# Patient Record
Sex: MALE | Race: BLACK OR AFRICAN AMERICAN | NOT HISPANIC OR LATINO
[De-identification: names, ages, dates, MRNs, and addresses within clinical notes are randomized per-mention and may not be internally consistent; named-entity substitution may affect disease eponyms.]

---

## 2018-07-20 ENCOUNTER — APPOINTMENT (OUTPATIENT)
Age: 68
End: 2018-07-20
Payer: MEDICARE

## 2018-07-20 VITALS — HEIGHT: 69 IN | WEIGHT: 160 LBS | BODY MASS INDEX: 23.7 KG/M2

## 2018-07-20 PROCEDURE — 99204 OFFICE O/P NEW MOD 45 MIN: CPT

## 2018-10-30 ENCOUNTER — OUTPATIENT (OUTPATIENT)
Dept: OUTPATIENT SERVICES | Facility: HOSPITAL | Age: 68
LOS: 1 days | End: 2018-10-30
Payer: MEDICARE

## 2018-10-30 ENCOUNTER — APPOINTMENT (OUTPATIENT)
Dept: MRI IMAGING | Facility: HOSPITAL | Age: 68
End: 2018-10-30

## 2018-10-30 PROCEDURE — 70545 MR ANGIOGRAPHY HEAD W/DYE: CPT

## 2018-10-30 PROCEDURE — A9585: CPT

## 2018-10-30 PROCEDURE — 70545 MR ANGIOGRAPHY HEAD W/DYE: CPT | Mod: 26

## 2019-01-17 ENCOUNTER — APPOINTMENT (OUTPATIENT)
Dept: NEUROSURGERY | Facility: CLINIC | Age: 69
End: 2019-01-17
Payer: MEDICARE

## 2019-01-17 VITALS
DIASTOLIC BLOOD PRESSURE: 77 MMHG | HEART RATE: 64 BPM | WEIGHT: 160 LBS | OXYGEN SATURATION: 95 % | HEIGHT: 68 IN | RESPIRATION RATE: 16 BRPM | TEMPERATURE: 97.4 F | SYSTOLIC BLOOD PRESSURE: 146 MMHG | BODY MASS INDEX: 24.25 KG/M2

## 2019-01-17 PROCEDURE — 99215 OFFICE O/P EST HI 40 MIN: CPT

## 2019-01-22 NOTE — REASON FOR VISIT
[Follow-Up: _____] : a [unfilled] follow-up visit [FreeTextEntry1] : Here for comprehensive evaluation of his Lumbar Spine.\par He reports ongoing LEg pain and difficulty walking.\par He currently ambulates with cane. He reports that he has no interest in having back surgery. He further explained that Dr. Glasgow sent him for Evaluation of MRI findings.

## 2019-01-22 NOTE — HISTORY OF PRESENT ILLNESS
[FreeTextEntry1] : Here for follow up with New MRI  Cervical Spine\par Was seen in 2015- No surgery recommended at that time\par

## 2019-01-22 NOTE — REVIEW OF SYSTEMS
[Feeling Poorly] : feeling poorly [Abnormal Sensation] : an abnormal sensation [Difficulty Walking] : difficulty walking [Limping] : limping [Arthralgias] : arthralgias [Joint Stiffness] : joint stiffness [Limb Pain] : limb pain [Negative] : Endocrine [Leg Weakness] : no leg weakness [Joint Swelling] : no joint swelling [Limb Swelling] : no limb swelling

## 2019-01-22 NOTE — ASSESSMENT
[FreeTextEntry1] : 1. Lumbar Laminectomy recommended: Pt refused.\par 2. Continue Pain Management.\par 3. Education provided regarding plan of care

## 2019-01-22 NOTE — DATA REVIEWED
[de-identified] : Exam requested by:\par RICKEY SCOTT MD\par [address], [address]\[place] [address]\par SITE PERFORMED: [facility]\par Patient: KALEB ARVIZU\par YOB: 1950\par Phone: [phone]\par MRN: [id number] Acc: [id number]\par Date of Exam: 07-\par EXAM:  MRI LUMBAR SPINE WITHOUT CONTRAST\par \par HISTORY:  68-year-old male. Low back pain. Assess for spinal stenosis\par \par TECHNIQUE:  MRI of the lumbar spine was performed utilizing axial T1 and T2, as well as sagittal T1, T2, and proton density weighted pulse sequences, without intravenous contrast material.\par \par COMPARISON:  1/18/2008 lumbar spine radiographs\par \par FINDINGS:\par \par The conus terminates at the L1-2 disc space level.\par \par There is straightening of the normal lumbar lordosis. This may be based on muscle spasm and/or degenerative changes.\par \par There is a grade 1, 2 mm retrolisthesis of T11 on T12. There is a grade 1, 3 mm retrolisthesis of L1 on L2 and L2 on L3\par \par There are moderate to severe degenerative endplate marrow changes affecting the endplates of T11-S1 is a most advanced at L3-L5. There are endplates at every level, largest at L1-L5.. There is no abnormal diffuse marrow infiltration or focal suspicious bone lesion. There is no acute edema or compression fracture\par \par There is desiccation of the T10-11 through L5-S1 discs. There is mild narrowing of the T10-11 through L2-3 and L5-S1 discs. There is moderate to severe narrowing of the L3-4 and L4-5 disc space     \par \par There are multiple partially imaged renal cystic lesions. Largest left renal cystic lesion measures at least 4.6 cm in diameter. The largest visualized right renal cystic lesion measures 2.7 cm in diameter. There is no hydronephrosis     .\par \par There is an infrarenal abdominal aortic aneurysm measuring at least 3.1 cm in AP diameter.\par \par There is mild deconditioning/atrophy of the paraspinous muscles and the psoas muscles bilaterally.\par \par L1-L2: Grade 1, 3 mm retrolisthesis. Bulging disc diffusely resulting in mild left foraminal stenosis. Mild facet arthrosis. \par \par L2-L3: Grade 1, 3 mm retrolisthesis. Central/right foraminal protrusion resulting in moderate central and right lateral recess/foraminal stenosis. . Bulging disc component results in mild-to-moderate left foraminal stenosis with mild to moderate facet arthrosis\par \par L3-L4: Bulging disc/osteophyte complex diffusely resulting in severe central and bilateral lateral recess/foraminal stenosis with moderate facet arthrosis. Compression of the thecal sac and the exiting nerve roots.  \par \par L4-L5: Bulging disc/osteophyte complex resulting in severe central and bilateral lateral recess/foraminal stenosis with moderate to severe facet arthrosis. Compression the thecal sac and the exiting nerve roots\par \par L5-S1: Bulging disc/osteophyte complex resulting in severe left foraminal stenosis. Moderate to severe central and right retinal stenosis with severe facet arthrosis.\par \par IMPRESSION: \par \par \par 1. Grade 1, 2 mm retrolisthesis of T11 on T12. Grade 1, 3 m retrolisthesis of L1 on L2 and L2 on L3\par \par Recommend lumbar spine radiographs series including bilateral oblique and lateral, flexion, and extension views.\par \par 2. Advanced lower thoracic and lumbar spondylosis resulting in multilevel stenosis from L1-2 through L5-S1, as detailed above.\par \par 3. Central/right foraminal L2-3 disc herniation resulting in moderate central and right lateral recess/foraminal stenosis.\par \par 4. Bulging L3-4 L4-5 disc/osteophyte complex resulting in severe/critical central and bilateral lateral recess/foraminal stenosis in conjunction with facet arthrosis.. Compression of the thecal sac and the exiting nerve roots.\par \par 5. Infrarenal abdominal aortic aneurysm\par \par Recommend abdominal aortic Doppler ultrasound to assess aneurysm.\par \par 6. Multiple bilateral renal cystic lesions\par \par Recommend bilateral renal ultrasound\par Thank you for the opportunity to participate in the care of this patient.  \par  \par Daniel Davis MD [phone]\par Electronically Signed: 07- 11:17 AM\par Exam requested by:RICKEY SCOTT MD\par Confidential\par [facility]:\par [address] * [place] [address]\par  \par  \par  \par [facility]:\par [address]par Lower Level & 9th Floor * [place] [address]\par  \par [facility]:\par [address] * Ground Floor & Lower Level\[place] [address]\par  \par [facility]:\par [address] * 3rd & 5th Floor\[place] [address]\par  \par [facility]:\par [address] * [address] * [place] [address]\par  \par [address]par [address] * 3rd Floor\[place] [address]\par  \par  \par Printed: 01- 6:58 PM\par KALEB ARVIZU (Exam: 07- 9:00 AM)\par Page 1 of 1\par

## 2019-07-19 ENCOUNTER — APPOINTMENT (OUTPATIENT)
Dept: VASCULAR SURGERY | Facility: CLINIC | Age: 69
End: 2019-07-19

## 2019-09-04 DIAGNOSIS — I71.4 ABDOMINAL AORTIC ANEURYSM, W/OUT RUPTURE: ICD-10-CM

## 2019-09-06 ENCOUNTER — APPOINTMENT (OUTPATIENT)
Dept: VASCULAR SURGERY | Facility: CLINIC | Age: 69
End: 2019-09-06

## 2019-09-09 ENCOUNTER — FORM ENCOUNTER (OUTPATIENT)
Age: 69
End: 2019-09-09

## 2019-09-10 ENCOUNTER — APPOINTMENT (OUTPATIENT)
Dept: CT IMAGING | Facility: HOSPITAL | Age: 69
End: 2019-09-10
Payer: MEDICARE

## 2019-09-10 ENCOUNTER — OUTPATIENT (OUTPATIENT)
Dept: OUTPATIENT SERVICES | Facility: HOSPITAL | Age: 69
LOS: 1 days | End: 2019-09-10
Payer: MEDICARE

## 2019-09-10 PROCEDURE — 74174 CTA ABD&PLVS W/CONTRAST: CPT | Mod: 26

## 2019-09-10 PROCEDURE — 74174 CTA ABD&PLVS W/CONTRAST: CPT

## 2020-06-22 ENCOUNTER — APPOINTMENT (OUTPATIENT)
Dept: VASCULAR SURGERY | Facility: CLINIC | Age: 70
End: 2020-06-22
Payer: MEDICARE

## 2020-06-22 PROCEDURE — 99214 OFFICE O/P EST MOD 30 MIN: CPT

## 2020-06-22 PROCEDURE — 93923 UPR/LXTR ART STDY 3+ LVLS: CPT

## 2020-07-01 NOTE — PHYSICAL EXAM
[Normal Breath Sounds] : Normal breath sounds [2+] : left 2+ [Oriented to Person] : oriented to person [Alert] : alert [Oriented to Place] : oriented to place [Oriented to Time] : oriented to time [Calm] : calm [Ankle Swelling (On Exam)] : present [1+] : left 1+ [Ankle Swelling On The Right] : mild [Ankle Swelling Bilaterally] : bilaterally  [JVD] : no jugular venous distention  [Abdomen Tenderness] : ~T ~M No abdominal tenderness [de-identified] : well appearing, NAD [de-identified] : NC/AT

## 2020-07-01 NOTE — HISTORY OF PRESENT ILLNESS
[FreeTextEntry1] : 69 yo m with pmh of cervical spine fx, type 2 DM, HTN, and degenerative cervical disc, AAA that was measuring 3.7 cm in 9/2019 returns for a f/u.  Pt c/o numbness of his both legs that precludes him from walking more than 1-2 blocks. CTA from 9/2019 demonstrated occlusion of b/l internal iliac arteries w/lack of opacification of most internal iliac artery branches. He denies rest pain, skin breakdown, abdominal pain. He ambulates with a cane.

## 2020-07-01 NOTE — ASSESSMENT
[Arterial/Venous Disease] : arterial/venous disease [Aneurysm Surgery] : aneurysm surgery [FreeTextEntry1] : 69 yo m with pmh of cervical spine fx, type 2 DM, HTN, and degenerative cervical disc, AAA that was measuring 3.7 cm in 9/2019 returns for a f/u.\par Patient with claudication and hx of AAA.\par CHEL/PVR was done in the office today demonstrating moderate disease.\par We will obtain CTA of abdomen/pelvis to f/u on AAA and will decide on how to proceed.\par Pt will f/u in 2 weeks after he completes CTA.\par

## 2020-07-01 NOTE — REVIEW OF SYSTEMS
[Limb Pain] : limb pain [Negative] : Heme/Lymph [Fever] : no fever [Chills] : no chills [Dizziness] : no dizziness

## 2020-07-10 ENCOUNTER — APPOINTMENT (OUTPATIENT)
Dept: VASCULAR SURGERY | Facility: CLINIC | Age: 70
End: 2020-07-10
Payer: MEDICARE

## 2020-07-10 PROCEDURE — 99214 OFFICE O/P EST MOD 30 MIN: CPT

## 2020-07-10 NOTE — HISTORY OF PRESENT ILLNESS
[FreeTextEntry1] : 69 yo m with pmh of cervical spine fx, type 2 DM, HTN, and degenerative cervical disc, AAA that was measuring 3.7 cm in 9/2019 returns for a f/u.  Pt c/o numbness of his both legs that precludes him from walking more than 1-2 blocks. Patient was seen on 6/22/2020 and had CHEL/PVR done and we suggested for him to have CTA of aorta to be done to f/u on AAA and to look at his arterial disease of his LEs. Today he presents to discuss the results.

## 2020-07-10 NOTE — REVIEW OF SYSTEMS
[Limb Pain] : limb pain [Negative] : Heme/Lymph [Chills] : no chills [Fever] : no fever [Dizziness] : no dizziness

## 2020-07-10 NOTE — PHYSICAL EXAM
[Normal Breath Sounds] : Normal breath sounds [2+] : left 2+ [1+] : left 1+ [Ankle Swelling Bilaterally] : bilaterally  [Ankle Swelling (On Exam)] : present [Ankle Swelling On The Right] : mild [Alert] : alert [Oriented to Place] : oriented to place [Oriented to Person] : oriented to person [Calm] : calm [Oriented to Time] : oriented to time [de-identified] : well appearing, NAD [Abdomen Tenderness] : ~T ~M No abdominal tenderness [JVD] : no jugular venous distention  [de-identified] : NC/AT

## 2020-07-15 ENCOUNTER — OUTPATIENT (OUTPATIENT)
Dept: OUTPATIENT SERVICES | Facility: HOSPITAL | Age: 70
LOS: 1 days | End: 2020-07-15
Payer: MEDICARE

## 2020-07-15 ENCOUNTER — RESULT REVIEW (OUTPATIENT)
Age: 70
End: 2020-07-15

## 2020-07-15 ENCOUNTER — APPOINTMENT (OUTPATIENT)
Dept: ORTHOPEDIC SURGERY | Facility: CLINIC | Age: 70
End: 2020-07-15
Payer: MEDICARE

## 2020-07-15 VITALS
HEIGHT: 69 IN | OXYGEN SATURATION: 96 % | SYSTOLIC BLOOD PRESSURE: 135 MMHG | DIASTOLIC BLOOD PRESSURE: 70 MMHG | BODY MASS INDEX: 23.99 KG/M2 | TEMPERATURE: 97.6 F | WEIGHT: 162 LBS | HEART RATE: 54 BPM

## 2020-07-15 PROCEDURE — 72084 X-RAY EXAM ENTIRE SPI 6/> VW: CPT

## 2020-07-15 PROCEDURE — 72082 X-RAY EXAM ENTIRE SPI 2/3 VW: CPT

## 2020-07-15 PROCEDURE — 72084 X-RAY EXAM ENTIRE SPI 6/> VW: CPT | Mod: 26

## 2020-07-15 PROCEDURE — 99204 OFFICE O/P NEW MOD 45 MIN: CPT

## 2020-07-15 PROCEDURE — 72100 X-RAY EXAM L-S SPINE 2/3 VWS: CPT

## 2020-07-21 NOTE — PHYSICAL EXAM
[de-identified] : Physical Exam:\par \par General: patient is well developed, well nourished, in no acute \par distress, alert and oriented x 3. \par \par Mood and affect: normal\par \par Respiratory: no respiratory distress noted\par \par Skin: no scars over spine, skin intact, no erythema, increased warmth\par \par Alignment:The spine is well compensated in the coronal and sagittal plane.  There is no asymmetry on the patel forward bend test\par \par Gait: The patient is able to toe walk and heel walk without difficulty. The patient is able to tandem gait without difficutly.\par \par Palpation: no tenderness to palpation spine or paraspinal region\par \par Range of motion: Lumbar spine ROM is restricted\par \par Neurologic Exam:\par Motor: Manual Muscle testing in the lower extremities is 5 out of 5 in all muscle groups. There is no evidence of muscular atrophy in the lower extremities. Sensory: Sensation to light touch is grossly intact in the lower extremities\par \par Reflexes: DTR are present and symmetric throughout, negative sena bilaterally, negative inverted radial reflex bilaterally, no clonus, plantar responses are flexor\par \par Hip Exam: No pain with internal or external rotation of hips bilaterally\par \par Special tests: Straight leg raise test negative.  Cross straight leg test negative.  DILLON test negative\par \par Vascular: Examination of the peripheral vascular system demonstrates no evidence of congestion or edema. no evidence of lymphadema bilateral lower extremities, pulses are present and symmetric in both lower extremities. [de-identified] : MRI 2018 lumbar severe L2-5 stenosis\par \par XR 7/20: Prior OC fusion, rods are broken.  chronic T12 wedge deformity.  questonable L4 pars defect on xray.  moderate L4/5 disc loss, mild at other levels.  no instaiblity seen..

## 2020-07-21 NOTE — HISTORY OF PRESENT ILLNESS
[de-identified] : Mr. ARVIZU is a very pleasant 70 year old male who complains of  severe right sided back pain radiating down right leg to the foot.  this is chronic.  also has severe bilateral lower extremity claudication that comes on after 1-2 blocks, causing him to need to stop, resolved with rest.  seen by dr. saxena of vascular surgery and no evidence of arterial occlusive disease.  . On initial presentation symptoms were as follows: Patient described the pain as constant.  Patient rated the pain as severe.  The pain localized to lower back.  There is radiation of pain.  Patient  reports lower extremity numbness and paresthesias.\par \par The patient reports no loss of hand dexterity.\par The patient states there no loss of balance when walking.\par There no sensory loss in the arms or legs\par The patent no difficulty with urination.\par \par The patient no history of previous spine surgery.\par \par The patient has no history of unexpected weight loss, no history of active cancer, no history bladder or bowel dysfunction, no night pain, no fevers or chills.\par \par The past medical history, surgical history, family history, allergies, medications, review of systems, family history and social history were reviewed and non contributory.

## 2020-07-21 NOTE — DISCUSSION/SUMMARY
[de-identified] : Has evidence of severe lumbar stenosis from MRI 2018.  Symptoms consistent with severe neurogenic claudication consistent with prior imaging.  recommend new MRI lumbar spine.  follow up after imaging done.

## 2020-07-29 ENCOUNTER — APPOINTMENT (OUTPATIENT)
Dept: ORTHOPEDIC SURGERY | Facility: CLINIC | Age: 70
End: 2020-07-29

## 2020-07-31 ENCOUNTER — APPOINTMENT (OUTPATIENT)
Dept: CT IMAGING | Facility: HOSPITAL | Age: 70
End: 2020-07-31
Payer: MEDICARE

## 2020-07-31 ENCOUNTER — OUTPATIENT (OUTPATIENT)
Dept: OUTPATIENT SERVICES | Facility: HOSPITAL | Age: 70
LOS: 1 days | End: 2020-07-31
Payer: MEDICARE

## 2020-07-31 PROCEDURE — 74174 CTA ABD&PLVS W/CONTRAST: CPT | Mod: 26

## 2020-07-31 PROCEDURE — 74174 CTA ABD&PLVS W/CONTRAST: CPT

## 2020-08-04 ENCOUNTER — APPOINTMENT (OUTPATIENT)
Dept: ORTHOPEDIC SURGERY | Facility: CLINIC | Age: 70
End: 2020-08-04
Payer: MEDICARE

## 2020-08-04 VITALS — TEMPERATURE: 96.7 F

## 2020-08-04 PROCEDURE — 99215 OFFICE O/P EST HI 40 MIN: CPT | Mod: 57

## 2020-08-04 NOTE — DISCUSSION/SUMMARY
[de-identified] : I have discussed my findings with the patient.  Mr. Hopson is suffering from progressive severe lumbar radiculopathy and neurogenic claudication.  His new MRI shows L2-S1 stenosis, severe.  We discussed options for treatment.  The patient is a candidate for L2-S1 laminectomy, partial facetectomy, foraminotomies.  Further non operative treatment would include pain management.  Risks, benefits, alternatives were discussed with the patient at great length, including but not limited to, bleeding, infection, persistent neurologic deficit, worsening pain, worsening neurologic status, CSF leakage, medical complications (including but not limited to cardiac, pulmonary, renal), and the need for further surgery.  The patient asked a number of cogent questions.  All questions were answered.  The patient demonstrated understanding of the risks, benefits, and alternatives.  The patient will discuss it with his family and PMD.  Materials given to read.  Will contact us if he wishes to proceed.

## 2020-08-04 NOTE — HISTORY OF PRESENT ILLNESS
[de-identified] : Follow up: 8/4/20: continues to have severe back pain radiating down both lower extremities, worse on the right.  has bilateral lower extremity claudication that starts after 1-2 blocks, causing him to stop and resolved with rest.  had MRI lumbar spine.\par \par Initial: Mr. ARVIZU is a very pleasant 70 year old male who complains of  severe right sided back pain radiating down right leg to the foot.  this is chronic.  also has severe bilateral lower extremity claudication that comes on after 1-2 blocks, causing him to need to stop, resolved with rest.  seen by dr. saxena of vascular surgery and no evidence of arterial occlusive disease.  . On initial presentation symptoms were as follows: Patient described the pain as constant.  Patient rated the pain as severe.  The pain localized to lower back.  There is radiation of pain.  Patient  reports lower extremity numbness and paresthesias.\par \par The patient reports no loss of hand dexterity.\par The patient states there no loss of balance when walking.\par There no sensory loss in the arms or legs\par The patent no difficulty with urination.\par \par The patient no history of previous spine surgery.\par \par The patient has no history of unexpected weight loss, no history of active cancer, no history bladder or bowel dysfunction, no night pain, no fevers or chills.\par \par The past medical history, surgical history, family history, allergies, medications, review of systems, family history and social history were reviewed and non contributory.

## 2020-08-04 NOTE — PHYSICAL EXAM
[de-identified] : MRI 2020: L2/3 moderate canal stenosis, L3/4: severe canal stenosis, foraminal stenosis, L4/5 very severe canal stenosis, foraminal stenosis L5/S1 foraminal stenosis\par \par MRI 2018 lumbar severe L2-5 stenosis\par \par XR 7/20: Prior OC fusion, rods are broken.  chronic T12 wedge deformity.  questonable L4 pars defect on xray.  moderate L4/5 disc loss, mild at other levels.  no instaiblity seen.. [de-identified] : Physical Exam:\par \par General: patient is well developed, well nourished, in no acute \par distress, alert and oriented x 3. \par \par Mood and affect: normal\par \par Respiratory: no respiratory distress noted\par \par Skin: no scars over spine, skin intact, no erythema, increased warmth\par \par Alignment:The spine is well compensated in the coronal and sagittal plane.  There is no asymmetry on the patel forward bend test\par \par Gait: The patient is able to toe walk and heel walk without difficulty. The patient is able to tandem gait without difficutly.\par \par Palpation: no tenderness to palpation spine or paraspinal region\par \par Range of motion: Lumbar spine ROM is restricted\par \par Neurologic Exam:\par Motor: Manual Muscle testing in the lower extremities is 5 out of 5 in all muscle groups. There is no evidence of muscular atrophy in the lower extremities. Sensory: Sensation to light touch is grossly intact in the lower extremities\par \par Reflexes: DTR are present and symmetric throughout, negative sena bilaterally, negative inverted radial reflex bilaterally, no clonus, plantar responses are flexor\par \par Hip Exam: No pain with internal or external rotation of hips bilaterally\par \par Special tests: Straight leg raise test negative.  Cross straight leg test negative.  DILLON test negative\par \par Vascular: Examination of the peripheral vascular system demonstrates no evidence of congestion or edema. no evidence of lymphadema bilateral lower extremities, pulses are present and symmetric in both lower extremities.

## 2020-11-10 ENCOUNTER — APPOINTMENT (OUTPATIENT)
Dept: ORTHOPEDIC SURGERY | Facility: CLINIC | Age: 70
End: 2020-11-10
Payer: MEDICARE

## 2020-11-10 VITALS — TEMPERATURE: 97.1 F

## 2020-11-10 PROCEDURE — 99214 OFFICE O/P EST MOD 30 MIN: CPT

## 2020-11-10 PROCEDURE — 99072 ADDL SUPL MATRL&STAF TM PHE: CPT

## 2021-01-29 ENCOUNTER — APPOINTMENT (OUTPATIENT)
Dept: VASCULAR SURGERY | Facility: CLINIC | Age: 71
End: 2021-01-29
Payer: MEDICARE

## 2021-01-29 PROCEDURE — 99213 OFFICE O/P EST LOW 20 MIN: CPT

## 2021-01-29 PROCEDURE — 93978 VASCULAR STUDY: CPT

## 2021-01-29 PROCEDURE — 99072 ADDL SUPL MATRL&STAF TM PHE: CPT

## 2021-01-29 NOTE — ASSESSMENT
[FreeTextEntry1] : 71 yo m with pmh of cervical spine fx, type 2 DM, HTN, and degenerative cervical disc, AAA that was measuring 4.1 cm from CTA on 6/22/2020. On exam, general abdominal exam benign, no pulsatile abdominal masses, soft, no guarding and no rebound. Discussed with pt lower back and LE pain likely secondary to arthritis.No vascular surgery interventions indicated at this time. Will continue to monitor for now, pt will follow up with us here with repeat AAA US in 6 months.

## 2021-01-29 NOTE — PHYSICAL EXAM
[Normal Breath Sounds] : Normal breath sounds [2+] : left 2+ [1+] : left 1+ [Ankle Swelling Bilaterally] : bilaterally  [Ankle Swelling (On Exam)] : present [Ankle Swelling On The Right] : mild [No Rash or Lesion] : No rash or lesion [Alert] : alert [Oriented to Person] : oriented to person [Oriented to Place] : oriented to place [Oriented to Time] : oriented to time [Calm] : calm [JVD] : no jugular venous distention  [Abdomen Tenderness] : ~T ~M No abdominal tenderness [de-identified] : well appearing, NAD [de-identified] : NC/AT [de-identified] : FROM 5/5 x 4. ambulates with a cane.

## 2021-01-29 NOTE — ADDENDUM
[FreeTextEntry1] : This note was written by Mimi Wallace on 01/29/2021 acting as scribe for Stephanie Rankin M.D.\par \par I, Stephanie Lauren have read and attest that all the information, medical decision making and discharge instructions within are true and accurate.

## 2021-01-29 NOTE — HISTORY OF PRESENT ILLNESS
[FreeTextEntry1] : 69 y/o M with PMH of cervical spine fx, T2DM, HTN, and degenerative cervical disc, AAA measuring 4.1 cm, occlusion of b/l Internal iliac arteries with lack of opacification of most internal iliac branches from CTA of aorta completed on 6/22/2020 presents for a follow up evaluation.  Pt reports he continues to experience some discomfort to his lower back and LEs but has been active around the house. He denies any unusual chest pain, abdominal pain, bladder or bowel dysfunction, falls, fever or chills.

## 2021-01-29 NOTE — PROCEDURE
[FreeTextEntry1] : Abdominal US: + AAA in the infrarenal segment of the aorta, measuring 3.5 x 3.7 cm. Thrombus noted in the aneurysmal cavity.

## 2021-02-09 NOTE — DISCUSSION/SUMMARY
[de-identified] : I have discussed my findings with the patient. Mr. Hopson is suffering from progressive severe lumbar radiculopathy and neurogenic claudication. His new MRI shows L2-S1 stenosis, severe. We discussed options for treatment. The patient is a candidate for L2-S1 laminectomy, partial facetectomy, foraminotomies. Further non operative treatment would include pain management. Risks, benefits, alternatives were discussed with the patient at great length, including but not limited to, bleeding, infection, persistent neurologic deficit, worsening pain, worsening neurologic status, CSF leakage, medical complications (including but not limited to cardiac, pulmonary, renal), and the need for further surgery. The patient asked a number of cogent questions. Although strict hospital protocols are in place, also discussed the perioperative risk of justin COVID-19 during hospital stay. Patient understands. All questions were answered. The patient demonstrated understanding of the risks, benefits, and alternatives. The patient will consider surgery. Materials given to read. Will contact us if he wishes to proceed. Would need CT lumbar without contrast for preoperative planning.\par  \par

## 2021-02-09 NOTE — PHYSICAL EXAM
[de-identified] : Physical Exam:\par \par General: patient is well developed, well nourished, in no acute \par distress, alert and oriented x 3. \par \par Mood and affect: normal\par \par Respiratory: no respiratory distress noted\par \par Skin: no scars over spine, skin intact, no erythema, increased warmth\par \par Alignment:The spine is well compensated in the coronal and sagittal plane. There is no asymmetry on the patel forward bend test\par \par Gait: The patient is able to toe walk and heel walk without difficulty. The patient is able to tandem gait without difficutly.\par \par Palpation: no tenderness to palpation spine or paraspinal region\par \par Range of motion: Lumbar spine ROM is restricted\par \par Neurologic Exam:\par Motor: Manual Muscle testing in the lower extremities is 5 out of 5 in all muscle groups. There is no evidence of muscular atrophy in the lower extremities. Sensory: Sensation to light touch is grossly intact in the lower extremities\par \par Reflexes: DTR are present and symmetric throughout, negative sena bilaterally, negative inverted radial reflex bilaterally, no clonus, plantar responses are flexor\par \par Hip Exam: No pain with internal or external rotation of hips bilaterally\par \par Special tests: Straight leg raise test negative. Cross straight leg test negative. DILLON test negative\par \par Vascular: Examination of the peripheral vascular system demonstrates no evidence of congestion or edema. no evidence of lymphadema bilateral lower extremities, pulses are present and symmetric in both lower extremities.  [de-identified] : MRI 7/2020: L2/3 moderate canal stenosis, L3/4: severe canal stenosis, foraminal stenosis, L4/5 very severe canal stenosis, foraminal stenosis L5/S1 foraminal stenosis\par \par MRI 2018 lumbar severe L2-5 stenosis\par \par XR 7/20: Prior OC fusion, rods are broken. chronic T12 wedge deformity. questonable L4 pars defect on xray. moderate L4/5 disc loss, mild at other levels. no instaiblity seen..

## 2021-02-09 NOTE — HISTORY OF PRESENT ILLNESS
[de-identified] : Follow up 11/10/20: Patient presents for follow up. Continues to have severe right sided low back pain that radiates down both lower extremities. Symptoms exacerbated with standing/walking less than 1 block, resolve with rest. Denies bowel/bladder symptoms. Had exacerbation of symptoms 2-3 weeks ago, was walking down the street, had to sit down due to pain. EMS called and taken to [facility]. Discharged same day once pain controlled. Ambulating with a cane for stability. \par \par Follow up: 8/4/20: continues to have severe back pain radiating down both lower extremities, worse on the right. has bilateral lower extremity claudication that starts after 1-2 blocks, causing him to stop and resolved with rest. had MRI lumbar spine.\par \par Initial: Mr. ARVIZU is a very pleasant 70 year old male who complains of severe right sided back pain radiating down right leg to the foot. this is chronic. also has severe bilateral lower extremity claudication that comes on after 1-2 blocks, causing him to need to stop, resolved with rest. seen by dr. saxena of vascular surgery and no evidence of arterial occlusive disease.. On initial presentation symptoms were as follows: Patient described the pain as constant. Patient rated the pain as severe. The pain localized to lower back. There is radiation of pain. Patient reports lower extremity numbness and paresthesias.\par \par The patient reports no loss of hand dexterity.\par The patient states there no loss of balance when walking.\par There no sensory loss in the arms or legs\par The patent no difficulty with urination.\par \par The patient no history of previous spine surgery.\par \par The patient has no history of unexpected weight loss, no history of active cancer, no history bladder or bowel dysfunction, no night pain, no fevers or chills.\par \par The past medical history, surgical history, family history, allergies, medications, review of systems, family history and social history were reviewed and non contributory. \par  \par

## 2021-07-30 ENCOUNTER — APPOINTMENT (OUTPATIENT)
Dept: VASCULAR SURGERY | Facility: CLINIC | Age: 71
End: 2021-07-30
Payer: MEDICARE

## 2021-07-30 VITALS
DIASTOLIC BLOOD PRESSURE: 94 MMHG | SYSTOLIC BLOOD PRESSURE: 150 MMHG | WEIGHT: 168 LBS | HEART RATE: 73 BPM | HEIGHT: 69 IN | BODY MASS INDEX: 24.88 KG/M2

## 2021-07-30 PROCEDURE — 93978 VASCULAR STUDY: CPT

## 2021-07-30 PROCEDURE — 99213 OFFICE O/P EST LOW 20 MIN: CPT | Mod: 25

## 2021-08-03 NOTE — ASSESSMENT
[Arterial/Venous Disease] : arterial/venous disease [Medication Management] : medication management [Aneurysm Surgery] : aneurysm surgery [FreeTextEntry1] : 70 y/o M w/h/o cervical spine fx, type 2 DM, HTN, and degenerative cervical disc, AAA initially measured  3.7 cm in 9/2019 currently at 4.2 cm 7/2020 returns for a f/u on AAA. On exam, general exam benign. Abdominal duplex completed in the office today reveals AAA measuring 3.6 x 3.8 cm w/thrombus in the aneurysmal cavity. L ALEX measures 1.3 x 1.7 cm, R ALEX measures: 1.3 x 1.4 cm. Patient was explained that no vascular intervention is necessary at this time. Patient explained lower back symptoms are not from his aneurysm and recommended he see Dr. Fletcher for back pain. He is to f/u in 1 year.

## 2021-08-03 NOTE — PROCEDURE
[FreeTextEntry1] : Abdominal US ordered today to assess aneurysmal progression reveals: AAA measuring 3.6 x 3.8 cm w/thrombus in the aneurysmal cavity. L ALEX measures 1.3 x 1.7 cm, R ALEX measures: 1.3 x 1.4 cm.

## 2021-08-03 NOTE — HISTORY OF PRESENT ILLNESS
[FreeTextEntry1] : 70 y/o M w/h/o cervical spine fx, type 2 DM, HTN, and degenerative cervical disc, AAA initially measured  3.7 cm in 9/2019 currently at 3.5x 3.7 cm 7/2020 returns for a f/u on AAA.  Pt reports during the past week he developed severe arm  pain which prompted a visit to the [facility]. He was provided w/steroids and analgesics with significant improvement of his symptoms. Also, patient explains his lower back pain persists and would like to be provided referral for spine specialist. Otherwise denies any unusual abdominal pain, bladder or bowel dysfunction, n/v/d, fever or chills. \par \par \par \par

## 2021-08-03 NOTE — DATA REVIEWED
[FreeTextEntry1] : Abdominal US completed 1/29/2021 reveals: AAA measuring 3.5 x 3.7 cm w/ thrombus noted in the aneurysmal cavity.

## 2021-08-03 NOTE — PHYSICAL EXAM
[Normal Breath Sounds] : Normal breath sounds [Normal Heart Sounds] : normal heart sounds [2+] : left 2+ [1+] : left 1+ [Ankle Swelling (On Exam)] : present [Ankle Swelling Bilaterally] : bilaterally  [Ankle Swelling On The Right] : mild [Alert] : alert [Oriented to Person] : oriented to person [Oriented to Place] : oriented to place [Oriented to Time] : oriented to time [Calm] : calm [JVD] : no jugular venous distention  [Abdomen Tenderness] : ~T ~M No abdominal tenderness [Abdominal Bruit] : no abdominal bruit  [de-identified] : Calm and cooperative NAD [de-identified] : NC/AT [de-identified] : Supple  [de-identified] : BS+, soft, NT/ND [de-identified] : FROM [de-identified] : Grossly intact.

## 2021-08-03 NOTE — ADDENDUM
[FreeTextEntry1] : This note was written by Mimi Wallace on 07/30/2021 acting as scribe for Stephanie Rankin M.D.\par \par I, Dr. Stephanie Patel, personally performed the evaluation and management (E/M) services for this established patient who presents today with (an) existing condition(s).  That E/M includes conducting the examination, assessing all conditions, and (re)establishing/reinforcing a plan of care.  Today, my ACP, Kelsey TADEO, was here to observe my evaluation and management services for this condition to be followed going forward.\par \par \par \par \par

## 2021-08-24 ENCOUNTER — APPOINTMENT (OUTPATIENT)
Dept: ORTHOPEDIC SURGERY | Facility: CLINIC | Age: 71
End: 2021-08-24
Payer: MEDICARE

## 2021-08-24 PROCEDURE — 99214 OFFICE O/P EST MOD 30 MIN: CPT

## 2021-08-24 RX ORDER — ALLOPURINOL 100 MG/1
100 TABLET ORAL
Qty: 90 | Refills: 0 | Status: ACTIVE | COMMUNITY
Start: 2021-03-13

## 2021-08-24 RX ORDER — BRINZOLAMIDE 10 MG/ML
1 SUSPENSION/ DROPS OPHTHALMIC
Qty: 10 | Refills: 0 | Status: ACTIVE | COMMUNITY
Start: 2021-06-21

## 2021-08-24 RX ORDER — BRIMONIDINE TARTRATE, TIMOLOL MALEATE 2; 5 MG/ML; MG/ML
0.2-0.5 SOLUTION/ DROPS OPHTHALMIC
Qty: 5 | Refills: 0 | Status: ACTIVE | COMMUNITY
Start: 2021-03-27

## 2021-08-24 RX ORDER — SIMVASTATIN 20 MG/1
20 TABLET, FILM COATED ORAL
Qty: 90 | Refills: 0 | Status: ACTIVE | COMMUNITY
Start: 2021-08-13

## 2021-08-24 RX ORDER — SILDENAFIL 50 MG/1
50 TABLET ORAL
Qty: 10 | Refills: 0 | Status: ACTIVE | COMMUNITY
Start: 2021-05-17

## 2021-08-24 RX ORDER — LOSARTAN POTASSIUM 100 MG/1
100 TABLET, FILM COATED ORAL
Qty: 90 | Refills: 0 | Status: ACTIVE | COMMUNITY
Start: 2021-08-13

## 2021-08-24 RX ORDER — METOPROLOL SUCCINATE 25 MG/1
25 TABLET, EXTENDED RELEASE ORAL
Qty: 90 | Refills: 0 | Status: ACTIVE | COMMUNITY
Start: 2021-08-13

## 2021-08-24 RX ORDER — TIZANIDINE 4 MG/1
4 TABLET ORAL
Qty: 40 | Refills: 0 | Status: ACTIVE | COMMUNITY
Start: 2021-08-18

## 2021-08-24 RX ORDER — PREDNISONE 20 MG/1
20 TABLET ORAL
Qty: 21 | Refills: 0 | Status: ACTIVE | COMMUNITY
Start: 2021-07-28

## 2021-08-24 RX ORDER — NABUMETONE 750 MG/1
750 TABLET, FILM COATED ORAL
Qty: 30 | Refills: 0 | Status: ACTIVE | COMMUNITY
Start: 2021-04-10

## 2021-08-24 RX ORDER — BIMATOPROST 0.1 MG/ML
0.01 SOLUTION/ DROPS OPHTHALMIC
Qty: 2 | Refills: 0 | Status: ACTIVE | COMMUNITY
Start: 2021-03-30

## 2021-08-24 RX ORDER — NETARSUDIL 0.2 MG/ML
0.02 SOLUTION/ DROPS OPHTHALMIC; TOPICAL
Qty: 2 | Refills: 0 | Status: ACTIVE | COMMUNITY
Start: 2021-04-16

## 2021-08-24 RX ORDER — AMLODIPINE BESYLATE 5 MG/1
5 TABLET ORAL
Qty: 90 | Refills: 0 | Status: ACTIVE | COMMUNITY
Start: 2021-08-13

## 2021-08-24 NOTE — DISCUSSION/SUMMARY
[de-identified] : I have discussed my findings with the patient. Mr. Hopson is suffering from progressive severe lumbar radiculopathy and neurogenic claudication. His new MRI shows L2-S1 stenosis, severe. We discussed options for treatment. Patient not currently interested in surgical intervention. I am recommending an evaluation with Dr. Bose for consideration of lumbar ROBSON, referral given. Patient will follow up with me in 3-4 months, sooner if there is an issue. All questions answered.

## 2021-08-24 NOTE — HISTORY OF PRESENT ILLNESS
[de-identified] : Follow up 8/24/21: Patient presents for follow up. Continues to have severe right sided low back pain that radiates laterally down his right lower extremity to his foot. Symptoms largely unchanged from last visit. Denies new neurologic symptoms. Ambulating with a cane for support. Can walk 2-3 blocks before stopping. Has not had any treatment since last visit. \par \par Follow up 11/10/20: Patient presents for follow up. Continues to have severe right sided low back pain that radiates down both lower extremities. Symptoms exacerbated with standing/walking less than 1 block, resolve with rest. Denies bowel/bladder symptoms. Had exacerbation of symptoms 2-3 weeks ago, was walking down the street, had to sit down due to pain. EMS called and taken to [facility]. Discharged same day once pain controlled. Ambulating with a cane for stability. \par \par Follow up: 8/4/20: continues to have severe back pain radiating down both lower extremities, worse on the right. has bilateral lower extremity claudication that starts after 1-2 blocks, causing him to stop and resolved with rest. had MRI lumbar spine.\par \par Initial: Mr. ARVIZU is a very pleasant 70 year old male who complains of severe right sided back pain radiating down right leg to the foot. this is chronic. also has severe bilateral lower extremity claudication that comes on after 1-2 blocks, causing him to need to stop, resolved with rest. seen by dr. saxena of vascular surgery and no evidence of arterial occlusive disease.. On initial presentation symptoms were as follows: Patient described the pain as constant. Patient rated the pain as severe. The pain localized to lower back. There is radiation of pain. Patient reports lower extremity numbness and paresthesias.\par \par The patient reports no loss of hand dexterity.\par The patient states there no loss of balance when walking.\par There no sensory loss in the arms or legs\par The patent no difficulty with urination.\par \par The patient no history of previous spine surgery.\par \par The patient has no history of unexpected weight loss, no history of active cancer, no history bladder or bowel dysfunction, no night pain, no fevers or chills.\par \par The past medical history, surgical history, family history, allergies, medications, review of systems, family history and social history were reviewed and non contributory. \par  \par

## 2021-08-24 NOTE — PHYSICAL EXAM
[de-identified] : Physical Exam:\par \par General: patient is well developed, well nourished, in no acute \par distress, alert and oriented x 3. \par \par Mood and affect: normal\par \par Respiratory: no respiratory distress noted\par \par Skin: no scars over spine, skin intact, no erythema, increased warmth\par \par Alignment:The spine is well compensated in the coronal and sagittal plane. There is no asymmetry on the patel forward bend test\par \par Gait: The patient is able to toe walk and heel walk without difficulty. The patient is able to tandem gait without difficutly.\par \par Palpation: no tenderness to palpation spine or paraspinal region\par \par Range of motion: Lumbar spine ROM is restricted\par \par Neurologic Exam:\par Motor: Manual Muscle testing in the lower extremities is 5 out of 5 in all muscle groups. There is no evidence of muscular atrophy in the lower extremities. Sensory: Sensation to light touch is grossly intact in the lower extremities\par \par Reflexes: DTR are present and symmetric throughout, negative sena bilaterally, negative inverted radial reflex bilaterally, no clonus, plantar responses are flexor\par \par Hip Exam: No pain with internal or external rotation of hips bilaterally\par \par Special tests: Straight leg raise test negative. Cross straight leg test negative. DILLON test negative\par \par Vascular: Examination of the peripheral vascular system demonstrates no evidence of congestion or edema. no evidence of lymphadema bilateral lower extremities, pulses are present and symmetric in both lower extremities.  [de-identified] : MRI 7/2020: L2/3 moderate canal stenosis, L3/4: severe canal stenosis, foraminal stenosis, L4/5 very severe canal stenosis, foraminal stenosis L5/S1 foraminal stenosis\par \par MRI 2018 lumbar severe L2-5 stenosis\par \par XR 7/20: Prior OC fusion, rods are broken. chronic T12 wedge deformity. questonable L4 pars defect on xray. moderate L4/5 disc loss, mild at other levels. no instaiblity seen..

## 2021-09-15 ENCOUNTER — APPOINTMENT (OUTPATIENT)
Dept: PHYSICAL MEDICINE AND REHAB | Facility: CLINIC | Age: 71
End: 2021-09-15
Payer: MEDICARE

## 2021-09-15 VITALS
SYSTOLIC BLOOD PRESSURE: 140 MMHG | DIASTOLIC BLOOD PRESSURE: 80 MMHG | BODY MASS INDEX: 24.88 KG/M2 | TEMPERATURE: 98 F | HEART RATE: 77 BPM | HEIGHT: 69 IN | WEIGHT: 168 LBS | OXYGEN SATURATION: 98 %

## 2021-09-15 PROCEDURE — 99204 OFFICE O/P NEW MOD 45 MIN: CPT

## 2021-10-12 ENCOUNTER — APPOINTMENT (OUTPATIENT)
Dept: PHYSICAL MEDICINE AND REHAB | Facility: CLINIC | Age: 71
End: 2021-10-12

## 2022-05-02 ENCOUNTER — APPOINTMENT (OUTPATIENT)
Dept: VASCULAR SURGERY | Facility: CLINIC | Age: 72
End: 2022-05-02
Payer: MEDICARE

## 2022-05-02 VITALS
BODY MASS INDEX: 24.88 KG/M2 | SYSTOLIC BLOOD PRESSURE: 197 MMHG | DIASTOLIC BLOOD PRESSURE: 112 MMHG | HEART RATE: 76 BPM | WEIGHT: 168 LBS | HEIGHT: 69 IN

## 2022-05-02 PROCEDURE — 93978 VASCULAR STUDY: CPT

## 2022-05-02 PROCEDURE — 99212 OFFICE O/P EST SF 10 MIN: CPT

## 2022-05-03 RX ORDER — GABAPENTIN 300 MG/1
300 CAPSULE ORAL
Qty: 180 | Refills: 0 | Status: COMPLETED | COMMUNITY
Start: 2022-01-06

## 2022-05-03 RX ORDER — CIPROFLOXACIN HYDROCHLORIDE 500 MG/1
500 TABLET, FILM COATED ORAL
Qty: 14 | Refills: 0 | Status: COMPLETED | COMMUNITY
Start: 2022-03-17

## 2022-05-04 NOTE — PHYSICAL EXAM
[Normal Breath Sounds] : Normal breath sounds [Respiratory Effort] : normal respiratory effort [Normal Heart Sounds] : normal heart sounds [Normal Rate and Rhythm] : normal rate and rhythm [2+] : left 2+ [No Rash or Lesion] : No rash or lesion [Alert] : alert [Oriented to Person] : oriented to person [Oriented to Place] : oriented to place [Calm] : calm [Abdomen Tenderness] : ~T ~M No abdominal tenderness [de-identified] : NAD [FreeTextEntry1] : Pulsatile abdominal mass [de-identified] : +FROM

## 2022-05-04 NOTE — HISTORY OF PRESENT ILLNESS
[FreeTextEntry1] : 71 y/o M w/h/o cervical spine fx, type 2 DM, HTN, and degenerative cervical disc, AAA initially measured 3.7 cm in 9/2019 currently at 3.5x 3.7 cm 7/2020 returns for a f/u on AAA. Last U/S measurement in 7/2021 measured 3.6x 3.8cm. He denies any abdominal pain, N/V, leg pain, or claudication.\par

## 2022-05-04 NOTE — ADDENDUM
[FreeTextEntry1] : I, Dr. Santiago Childs, personally performed the evaluation and management (E/M) services for this established patient who presents today with (an) existing condition(s).  That E/M includes conducting the examination, assessing all conditions, and (re)establishing/reinforcing a plan of care.  Today, my ACP, Kelsey TADEO, was here to observe my evaluation and management services for this condition to be followed going forward.\par \par \par

## 2022-05-04 NOTE — ASSESSMENT
[Aneurysm Surgery] : aneurysm surgery [FreeTextEntry1] : 72 y/o M w/h/o cervical spine fx, type 2 DM, HTN, and degenerative cervical disc, AAA initially measured 3.7 cm in 9/2019 currently at 3.5x 3.7 cm 7/2020 returns for a f/u on AAA. Last U/S measurement in 7/2021 measured 3.6x 3.8cm. He denies any abdominal pain, N/V, leg pain, or claudication. It now measures 4cm\par Patient remains asymptomatic, no claudication\par - F/u in 1 year for repeat duplex

## 2023-05-01 ENCOUNTER — APPOINTMENT (OUTPATIENT)
Dept: VASCULAR SURGERY | Facility: CLINIC | Age: 73
End: 2023-05-01
Payer: MEDICARE

## 2023-05-01 VITALS
DIASTOLIC BLOOD PRESSURE: 105 MMHG | WEIGHT: 168 LBS | BODY MASS INDEX: 24.88 KG/M2 | HEART RATE: 97 BPM | HEIGHT: 69 IN | SYSTOLIC BLOOD PRESSURE: 197 MMHG

## 2023-05-01 PROCEDURE — 93978 VASCULAR STUDY: CPT

## 2023-05-01 PROCEDURE — 99214 OFFICE O/P EST MOD 30 MIN: CPT

## 2023-05-01 NOTE — HISTORY OF PRESENT ILLNESS
[FreeTextEntry1] : 73yoM w/h/o cervical spine fx, type 2 DM, HTN, and degenerative cervical disc, AAA initially measured 3.7 cm in 9/2019  returns for a f/u on AAA. Last U/S measurement in 5/2022 measured 4cm. He denies any abdominal pain, N/V, leg pain, or claudication. He is doing well overall.

## 2023-05-01 NOTE — PHYSICAL EXAM
[Normal Breath Sounds] : Normal breath sounds [Respiratory Effort] : normal respiratory effort [Normal Heart Sounds] : normal heart sounds [Normal Rate and Rhythm] : normal rate and rhythm [2+] : left 2+ [Abdomen Tenderness] : ~T ~M No abdominal tenderness [No Rash or Lesion] : No rash or lesion [Alert] : alert [Oriented to Person] : oriented to person [Oriented to Place] : oriented to place [Calm] : calm [de-identified] : NAD [FreeTextEntry1] : Pulsatile abdominal mass [de-identified] : +FROM

## 2023-05-01 NOTE — ASSESSMENT
[FreeTextEntry1] : 73yoM w/h/o cervical spine fx, type 2 DM, HTN, and degenerative cervical disc, AAA initially measured 3.7 cm in 9/2019  returns for a f/u on AAA. He denies any abdominal pain, N/V, leg pain, or claudication. It now measures 4.2cm.\par Patient remains asymptomatic, no claudication.\par We discussed the findings and recommend to f/u in 1 year for repeat duplex. [Aneurysm Surgery] : aneurysm surgery

## 2023-05-01 NOTE — ADDENDUM
[FreeTextEntry1] : I, Dr. Neftaly Seth, personally performed the evaluation and management (E/M) services for this established patient who presents today with (an) existing condition(s).  That E/M includes conducting the examination, assessing all conditions, and (re)establishing/reinforcing a plan of care.  Today, my ACP, Kelsey TADEO, was here to observe my evaluation and management services for this condition to be followed going forward.\par \par \par

## 2023-07-17 ENCOUNTER — APPOINTMENT (OUTPATIENT)
Dept: ORTHOPEDIC SURGERY | Facility: CLINIC | Age: 73
End: 2023-07-17
Payer: MEDICARE

## 2023-07-17 VITALS
HEART RATE: 54 BPM | HEIGHT: 69 IN | DIASTOLIC BLOOD PRESSURE: 102 MMHG | BODY MASS INDEX: 26.66 KG/M2 | TEMPERATURE: 97.8 F | SYSTOLIC BLOOD PRESSURE: 157 MMHG | OXYGEN SATURATION: 97 % | WEIGHT: 180 LBS

## 2023-07-17 PROCEDURE — 72083 X-RAY EXAM ENTIRE SPI 4/5 VW: CPT

## 2023-07-17 PROCEDURE — 99214 OFFICE O/P EST MOD 30 MIN: CPT

## 2023-07-21 ENCOUNTER — APPOINTMENT (OUTPATIENT)
Dept: PAIN MANAGEMENT | Facility: CLINIC | Age: 73
End: 2023-07-21

## 2023-07-24 NOTE — END OF VISIT
[FreeTextEntry3] : All medical record entries made by the Scribe were at my, Dr. Randy Fletcher, direction and personally dictated by me on 07/17/2023. I have reviewed the chart and agree that the record accurately reflects my personal performance of the history, physical exam, assessment and plan. I have also personally directed, reviewed, and agreed with the chart.

## 2023-07-24 NOTE — HISTORY OF PRESENT ILLNESS
[de-identified] : Follow up 07/17/2023: Mr. Hopson presents for follow up. He was last seen approximately 2 years ago. The patient states that he is confused but believes has was sent back by his primary care doctor. Patient reports continued right sided low back pain that radiates laterally down his entire lower extremity. He denies any recent change to his symptoms. Patient takes Tylenol occasionally with relief of symptoms. Patient is a poor historian.\par \par Follow up 8/24/21: Patient presents for follow up. Continues to have severe right sided low back pain that radiates laterally down his right lower extremity to his foot. Symptoms largely unchanged from last visit. Denies new neurologic symptoms. Ambulating with a cane for support. Can walk 2-3 blocks before stopping. Has not had any treatment since last visit. \par \par Follow up 11/10/20: Patient presents for follow up. Continues to have severe right sided low back pain that radiates down both lower extremities. Symptoms exacerbated with standing/walking less than 1 block, resolve with rest. Denies bowel/bladder symptoms. Had exacerbation of symptoms 2-3 weeks ago, was walking down the street, had to sit down due to pain. EMS called and taken to [facility]. Discharged same day once pain controlled. Ambulating with a cane for stability. \par \par Follow up: 8/4/20: continues to have severe back pain radiating down both lower extremities, worse on the right. has bilateral lower extremity claudication that starts after 1-2 blocks, causing him to stop and resolved with rest. had MRI lumbar spine.\par \par Initial: Mr. HOPSON is a very pleasant 70 year old male who complains of severe right sided back pain radiating down right leg to the foot. this is chronic. also has severe bilateral lower extremity claudication that comes on after 1-2 blocks, causing him to need to stop, resolved with rest. seen by dr. saxena of vascular surgery and no evidence of arterial occlusive disease.. On initial presentation symptoms were as follows: Patient described the pain as constant. Patient rated the pain as severe. The pain localized to lower back. There is radiation of pain. Patient reports lower extremity numbness and paresthesias.\par \par The patient reports no loss of hand dexterity.\par The patient states there no loss of balance when walking.\par There no sensory loss in the arms or legs\par The patent no difficulty with urination.\par \par The patient no history of previous spine surgery.\par \par The patient has no history of unexpected weight loss, no history of active cancer, no history bladder or bowel dysfunction, no night pain, no fevers or chills.\par \par The past medical history, surgical history, family history, allergies, medications, review of systems, family history and social history were reviewed and non contributory. \par  \par

## 2023-07-24 NOTE — PHYSICAL EXAM
[de-identified] : Physical Exam:\par \par General: patient is well developed, well nourished, in no acute\par distress, alert and oriented x 3.\par \par Mood and affect: normal\par \par Respiratory: no respiratory distress noted\par \par Skin: no scars over spine, skin intact, no erythema, increased warmth\par \par Alignment: The spine is well compensated in the coronal and sagittal plane.\par \par Gait: The patient is able to toe walk and heel walk without difficulty. The patient is able to tandem gait without difficulty.\par \par Palpation: no tenderness to palpation spine or paraspinal region\par \par Range of motion: Lumbar spine ROM is restricted.\par \par Neurologic Exam:\par Motor: Manual Muscle testing in the lower extremities is 5 out of 5 in all muscle groups. There is no evidence of muscular\par atrophy in the lower extremities. Sensory: Sensation to light touch is grossly intact in the lower extremities\par \par Reflexes: DTR are present and symmetric throughout, no clonus, plantar responses are flexor\par \par Hip Exam: No pain with internal or external rotation of hips bilaterally\par \par Special tests: Straight leg raise test negative. Cross straight leg test negative. DILLON test negative\par \par Vascular: Examination of the peripheral vascular system demonstrates no evidence of congestion or edema. no evidence of\par lymphedema bilateral lower extremities, pulses are present and symmetric in both lower extremities. [de-identified] : MRI 7/2020: L2/3 moderate canal stenosis, L3/4: severe canal stenosis, foraminal stenosis, L4/5 very severe canal stenosis, foraminal stenosis L5/S1 foraminal stenosis\par \par MRI 2018 lumbar severe L2-5 stenosis\par \par XR 7/20: Prior OC fusion, rods are broken. chronic T12 wedge deformity. questonable L4 pars defect on xray. moderate L4/5 disc loss, mild at other levels. no instaiblity seen..

## 2023-07-24 NOTE — ADDENDUM
[FreeTextEntry1] : Documented by Melita Cowan acting as a scribe for Dr. Randy Fletcher on 07/17/2023.

## 2023-07-24 NOTE — DISCUSSION/SUMMARY
[de-identified] : Diagnosis: Lumbar degenerative disc disease, lumbar spinal canal stenosis.\par \par I discussed my findings with the patient. Mr. Hopson may be suffering from severe lumbar radiculopathy and neurogenic claudication. His prior MRI from 07/2020 shows severe severe L2-S1 stenosis. We discussed options for treatment. Patient is not currently interested in surgical intervention and was adamant about this. I am recommending an evaluation with pain management, recommendation to Dr. David Borges given. Advised patient to bring family member or friend to any subsequent visits with Dr. Borges. He will follow up with me on an as needed basis if interested in surgical treatment. All questions answered.

## 2023-07-25 ENCOUNTER — APPOINTMENT (OUTPATIENT)
Dept: PAIN MANAGEMENT | Facility: CLINIC | Age: 73
End: 2023-07-25
Payer: MEDICARE

## 2023-07-25 VITALS
DIASTOLIC BLOOD PRESSURE: 109 MMHG | RESPIRATION RATE: 18 BRPM | SYSTOLIC BLOOD PRESSURE: 180 MMHG | OXYGEN SATURATION: 97 % | HEIGHT: 69 IN | WEIGHT: 180 LBS | HEART RATE: 78 BPM | BODY MASS INDEX: 26.66 KG/M2

## 2023-07-25 DIAGNOSIS — M25.78 OTHER INTERVERTEBRAL DISC DEGENERATION, LUMBAR REGION: ICD-10-CM

## 2023-07-25 DIAGNOSIS — M48.061 SPINAL STENOSIS, LUMBAR REGION WITHOUT NEUROGENIC CLAUDICATION: ICD-10-CM

## 2023-07-25 DIAGNOSIS — M51.36 OTHER INTERVERTEBRAL DISC DEGENERATION, LUMBAR REGION: ICD-10-CM

## 2023-07-25 DIAGNOSIS — M50.30 OTHER CERVICAL DISC DEGENERATION, UNSPECIFIED CERVICAL REGION: ICD-10-CM

## 2023-07-25 DIAGNOSIS — M54.2 CERVICALGIA: ICD-10-CM

## 2023-07-25 PROCEDURE — 99204 OFFICE O/P NEW MOD 45 MIN: CPT

## 2023-07-25 NOTE — ASSESSMENT
[FreeTextEntry1] : 73-year-old male patient with lumbar degenerative disc disease as well as spinal stenosis who is presenting today for evaluation of chronic LBP and also right radicular paresthesias that worsen when he walks. No focal neurological deficits were noted on exam today.  The patient's most recent MRI was done in 2020 and we discussed plan for new imaging (MRI lumbar spine without contrast) as his symptoms have progressed. Following the MRI we will have the patient follow up to evaluate options.\par \par Plan:\par -Insurance authorization for lumbar MRI no contrast and script for MRI was written\par -Have patient follow up after imaging completed\par -Will consider right transforaminal epidural steroid injection vs interlaminar injection based off of imaging\par -Anticipate he will need PT after injection as well, as last time patient was in PT he states it helped with his LBP.

## 2023-07-25 NOTE — PHYSICAL EXAM
[Spinous Process Tenderness] : spinous process tenderness [Cane] : ambulates with cane [SLR] : positive straight leg raise [Motor Strength Lower Extremities] : left (5/5) [] : Sensory: [L3-Bilat] : L3 [L4-Bilat] : L4 [L5-Bilat] : L5 [2+] : left patella 2+ [1+] : left ankle jerk 1+ [Normal] : Normal affect [Facet Tenderness] : no facet tenderness [Paraspinal Tenderness] : no paraspinal tenderness [Sacroiliac tenderness] : no sacroiliac tenderness [Piriformis Tenderness] : no piriformis tenderness [Ataxic] : not ataxic [Antalgic] : not antalgic [Rueda] : negative Rueda Test [Posey's Sign] : negative Posey's Sign [de-identified] : well developed, no acute distress, patient is slow to respond at times but oriented to situation [de-identified] : no tachycardia noted [de-identified] : peripheral edema noted in BLE [de-identified] : no abdominal distension [de-identified] : no lesions were noted in exposed areas of skin.

## 2023-07-25 NOTE — PHYSICAL EXAM
[Spinous Process Tenderness] : spinous process tenderness [Cane] : ambulates with cane [SLR] : positive straight leg raise [Motor Strength Lower Extremities] : left (5/5) [] : Sensory: [L3-Bilat] : L3 [L4-Bilat] : L4 [L5-Bilat] : L5 [2+] : left patella 2+ [1+] : left ankle jerk 1+ [Normal] : Normal affect [Facet Tenderness] : no facet tenderness [Paraspinal Tenderness] : no paraspinal tenderness [Sacroiliac tenderness] : no sacroiliac tenderness [Piriformis Tenderness] : no piriformis tenderness [Ataxic] : not ataxic [Antalgic] : not antalgic [Rueda] : negative Rueda Test [Posey's Sign] : negative Posey's Sign [de-identified] : well developed, no acute distress, patient is slow to respond at times but oriented to situation [de-identified] : no tachycardia noted [de-identified] : peripheral edema noted in BLE [de-identified] : no abdominal distension [de-identified] : no lesions were noted in exposed areas of skin.

## 2023-07-25 NOTE — REASON FOR VISIT
[Initial Visit] : an initial pain management visit [Follow-Up Visit] : a follow-up pain management visit

## 2023-07-25 NOTE — HISTORY OF PRESENT ILLNESS
[Back Pain] : back pain [_______] : [unfilled] [___ yrs] : [unfilled] year(s) ago [Episodic] : episodic [6] : a current pain level of 6/10 [10] : a maximum pain level of 10/10 [Sharp] : sharp [Shooting] : shooting [Electric] : electric [Walking] : walking [Sitting] : sitting [FreeTextEntry1] : 73-year-old male patient with past medical history of hypertension and lumbar degenerative disc disease as well as spinal stenosis presents to our clinic as a referral from Dr. Randy Fletcher.  Patient has primarily right leg paresthesias that started from his low back and limit his ability to ambulate distances further than 2-3 blocks.  He does use a cane to assist with ambulation.  He notes that the paresthesias are transient and come primarily when ambulating.  He describes them as sharp shooting pain that starts from his low back.  At its worst the radicular pain can be a 10 out of 10 but currently while seated, his axial low back pain is more noticeable and rates that pain at a 6 out of 10. He denies using a shopping cart/ flexing forward to help alleviate his symptoms. Denies any symptoms in his left leg. He has tried PT in the past which helped, but currently pain is too severe for him to participate. \par \par For pain control patient primarily uses Caitie back and body 100 mg 2 tablets daily.  He notes this only sometimes helps with his low back pain.  He has tried gabapentin in the past but states it increased his urination frequency.  He has also tried heating pads but this caused erythema ab igne and subsequently stopped. He is also a poor historian and unable to clearly discern whether his back or radiating leg pain is worse. \par \par  [FreeTextEntry7] : back pain with RLE paresthesias  [de-identified] : constant back pain and radiating RLE pain

## 2023-07-25 NOTE — HISTORY OF PRESENT ILLNESS
[Back Pain] : back pain [_______] : [unfilled] [___ yrs] : [unfilled] year(s) ago [Episodic] : episodic [6] : a current pain level of 6/10 [10] : a maximum pain level of 10/10 [Sharp] : sharp [Shooting] : shooting [Electric] : electric [Walking] : walking [Sitting] : sitting [FreeTextEntry1] : 73-year-old male patient with past medical history of hypertension and lumbar degenerative disc disease as well as spinal stenosis presents to our clinic as a referral from Dr. Randy Fletcher.  Patient has primarily right leg paresthesias that started from his low back and limit his ability to ambulate distances further than 2-3 blocks.  He does use a cane to assist with ambulation.  He notes that the paresthesias are transient and come primarily when ambulating.  He describes them as sharp shooting pain that starts from his low back.  At its worst the radicular pain can be a 10 out of 10 but currently while seated, his axial low back pain is more noticeable and rates that pain at a 6 out of 10. He denies using a shopping cart/ flexing forward to help alleviate his symptoms. Denies any symptoms in his left leg. He has tried PT in the past which helped, but currently pain is too severe for him to participate. \par \par For pain control patient primarily uses Caitie back and body 100 mg 2 tablets daily.  He notes this only sometimes helps with his low back pain.  He has tried gabapentin in the past but states it increased his urination frequency.  He has also tried heating pads but this caused erythema ab igne and subsequently stopped. He is also a poor historian and unable to clearly discern whether his back or radiating leg pain is worse. \par \par  [FreeTextEntry7] : back pain with RLE paresthesias  [de-identified] : constant back pain and radiating RLE pain

## 2023-07-25 NOTE — REVIEW OF SYSTEMS
[Lower Ext Edema] : lower extremity edema [Back Pain] : back pain [Radiating Pain] : radiating pain [Decreased ROM] : decreased range of motion [Decrease Hearing] : no decrease in hearing [SOB at rest] : no shortness of breath at rest [Fecal Incontinence] : no fecal incontinence [Incontinence] : no incontinence [Weakness] : no weakness [Numbness] : no numbness

## 2023-08-05 ENCOUNTER — OUTPATIENT (OUTPATIENT)
Dept: OUTPATIENT SERVICES | Facility: HOSPITAL | Age: 73
LOS: 1 days | End: 2023-08-05

## 2023-08-05 ENCOUNTER — APPOINTMENT (OUTPATIENT)
Dept: MRI IMAGING | Facility: CLINIC | Age: 73
End: 2023-08-05
Payer: MEDICARE

## 2023-08-05 PROCEDURE — 72148 MRI LUMBAR SPINE W/O DYE: CPT | Mod: 26

## 2023-09-05 ENCOUNTER — APPOINTMENT (OUTPATIENT)
Dept: PAIN MANAGEMENT | Facility: CLINIC | Age: 73
End: 2023-09-05
Payer: MEDICARE

## 2023-09-05 VITALS
HEART RATE: 64 BPM | BODY MASS INDEX: 26.66 KG/M2 | WEIGHT: 180 LBS | SYSTOLIC BLOOD PRESSURE: 180 MMHG | HEIGHT: 69 IN | DIASTOLIC BLOOD PRESSURE: 101 MMHG | OXYGEN SATURATION: 97 %

## 2023-09-05 DIAGNOSIS — M54.16 RADICULOPATHY, LUMBAR REGION: ICD-10-CM

## 2023-09-05 DIAGNOSIS — M48.062 SPINAL STENOSIS, LUMBAR REGION WITH NEUROGENIC CLAUDICATION: ICD-10-CM

## 2023-09-05 PROCEDURE — 99214 OFFICE O/P EST MOD 30 MIN: CPT

## 2023-09-06 NOTE — ASSESSMENT
[FreeTextEntry1] : 73M w/ severe spinal stenosis with neurogenic claudication and radiculopathy. Bilateral LE numbness and RLE pain in the L5 nerve distribution. He has seen Dr. Fletcher and does not want to pursue surgery at this time. He has failed conservative treatment including gabapentin, tylenol, 6 weeks of physician directed physical therapy.  - schedule for L2-3 interlaminar epidural steroid injection - patient advised to take his blood pressure medications due to his AAA of 4cm - may pursue minimally invasive lumbar decompression if ILESI fails to provide relief

## 2023-09-06 NOTE — PHYSICAL EXAM
[Cane] : ambulates with cane [LE] : Sensory: Intact in bilateral lower extremities [Spinous Process Tenderness] : no spinous process tenderness [Facet Tenderness] : no facet tenderness [Paraspinal Tenderness] : no paraspinal tenderness [Sacroiliac tenderness] : no sacroiliac tenderness [GreaterTrochanteric bursa tenderness] : no greater trochanteric bursa tenderness [Piriformis Tenderness] : no piriformis tenderness [Antalgic] : not antalgic [de-identified] : negative bilateral SLR 5/5 str bilateral lower extremities equal sensation bilaterally

## 2023-09-06 NOTE — HISTORY OF PRESENT ILLNESS
[Back Pain] : back pain [___ days] : [unfilled] day(s) ago [9] : a current pain level of 9/10 [7] : an average pain level of 7/10 [5] : a minimum pain level of 5/10 [10] : a maximum pain level of 10/10 [Sharp] : sharp [Shooting] : shooting [Standing] : standing [Walking] : walking [Sitting] : sitting [FreeTextEntry1] : 73M w/ HTN, AAA, lumbar degenerative disc disease, spinal stenosis referred from Dr. Randy Fletcher for followup of LBP with RLE radiation of pain and bilateral numbness  Today he continues to have sharp shooting LBP with radiation of symptoms to RLE pain in the lateral thigh and anterior lower leg radiating to the dorsal foot and toes. He reports havng bilateral numbness and tingling in his legs and subjective weakness. He states his symptoms worsen when he walks and he often has to sit for a while until his numbness subsides before he continues walking. He reports that he also often has to bend over when he moves around.  He has previously tried gabapentin and tylenol for his pain but they do not help. He also has tried physical therapy which had helped in the past but he states his pain is too severe to participate in PT.  He denies chest pain, visual changes, shortness of breath, bowel or bladder changes.  He has his MRI lumbar spine 08/2023 which demonstrates severe lumbar spinal stenosis at levels L2-3, L3-4, L4-5, and L5-S1. He has hypertrophied ligamentum flavum which were measured today at: L3-4 Right 6.1mm, Left 3.4 mm L4-L5 Right 6.2mm, Left 8.8 mm

## 2023-09-12 ENCOUNTER — EMERGENCY (EMERGENCY)
Facility: HOSPITAL | Age: 73
LOS: 1 days | Discharge: HOME | End: 2023-09-12
Attending: EMERGENCY MEDICINE | Admitting: EMERGENCY MEDICINE
Payer: MEDICARE

## 2023-09-12 VITALS
DIASTOLIC BLOOD PRESSURE: 93 MMHG | OXYGEN SATURATION: 96 % | HEART RATE: 69 BPM | RESPIRATION RATE: 16 BRPM | SYSTOLIC BLOOD PRESSURE: 182 MMHG | TEMPERATURE: 98 F

## 2023-09-12 VITALS
RESPIRATION RATE: 16 BRPM | TEMPERATURE: 98 F | OXYGEN SATURATION: 100 % | HEART RATE: 54 BPM | WEIGHT: 169.98 LBS | SYSTOLIC BLOOD PRESSURE: 134 MMHG | DIASTOLIC BLOOD PRESSURE: 81 MMHG

## 2023-09-12 DIAGNOSIS — R60.0 LOCALIZED EDEMA: ICD-10-CM

## 2023-09-12 DIAGNOSIS — R00.1 BRADYCARDIA, UNSPECIFIED: ICD-10-CM

## 2023-09-12 DIAGNOSIS — R20.2 PARESTHESIA OF SKIN: ICD-10-CM

## 2023-09-12 DIAGNOSIS — I10 ESSENTIAL (PRIMARY) HYPERTENSION: ICD-10-CM

## 2023-09-12 LAB
ALBUMIN SERPL ELPH-MCNC: 3.7 G/DL — SIGNIFICANT CHANGE UP (ref 3.3–5)
ALP SERPL-CCNC: 91 U/L — SIGNIFICANT CHANGE UP (ref 40–120)
ALT FLD-CCNC: 9 U/L — LOW (ref 10–45)
ANION GAP SERPL CALC-SCNC: 10 MMOL/L — SIGNIFICANT CHANGE UP (ref 5–17)
AST SERPL-CCNC: 20 U/L — SIGNIFICANT CHANGE UP (ref 10–40)
BASOPHILS # BLD AUTO: 0.02 K/UL — SIGNIFICANT CHANGE UP (ref 0–0.2)
BASOPHILS NFR BLD AUTO: 0.2 % — SIGNIFICANT CHANGE UP (ref 0–2)
BILIRUB SERPL-MCNC: 0.3 MG/DL — SIGNIFICANT CHANGE UP (ref 0.2–1.2)
BUN SERPL-MCNC: 13 MG/DL — SIGNIFICANT CHANGE UP (ref 7–23)
CALCIUM SERPL-MCNC: 9.1 MG/DL — SIGNIFICANT CHANGE UP (ref 8.4–10.5)
CHLORIDE SERPL-SCNC: 109 MMOL/L — HIGH (ref 96–108)
CO2 SERPL-SCNC: 21 MMOL/L — LOW (ref 22–31)
CREAT SERPL-MCNC: 1.48 MG/DL — HIGH (ref 0.5–1.3)
EGFR: 49 ML/MIN/1.73M2 — LOW
EOSINOPHIL # BLD AUTO: 0.38 K/UL — SIGNIFICANT CHANGE UP (ref 0–0.5)
EOSINOPHIL NFR BLD AUTO: 4.3 % — SIGNIFICANT CHANGE UP (ref 0–6)
GLUCOSE SERPL-MCNC: 100 MG/DL — HIGH (ref 70–99)
HCT VFR BLD CALC: 37.5 % — LOW (ref 39–50)
HGB BLD-MCNC: 12.3 G/DL — LOW (ref 13–17)
IMM GRANULOCYTES NFR BLD AUTO: 0.2 % — SIGNIFICANT CHANGE UP (ref 0–0.9)
LYMPHOCYTES # BLD AUTO: 2.14 K/UL — SIGNIFICANT CHANGE UP (ref 1–3.3)
LYMPHOCYTES # BLD AUTO: 24.1 % — SIGNIFICANT CHANGE UP (ref 13–44)
MAGNESIUM SERPL-MCNC: 1.8 MG/DL — SIGNIFICANT CHANGE UP (ref 1.6–2.6)
MCHC RBC-ENTMCNC: 29.3 PG — SIGNIFICANT CHANGE UP (ref 27–34)
MCHC RBC-ENTMCNC: 32.8 GM/DL — SIGNIFICANT CHANGE UP (ref 32–36)
MCV RBC AUTO: 89.3 FL — SIGNIFICANT CHANGE UP (ref 80–100)
MONOCYTES # BLD AUTO: 0.71 K/UL — SIGNIFICANT CHANGE UP (ref 0–0.9)
MONOCYTES NFR BLD AUTO: 8 % — SIGNIFICANT CHANGE UP (ref 2–14)
NEUTROPHILS # BLD AUTO: 5.62 K/UL — SIGNIFICANT CHANGE UP (ref 1.8–7.4)
NEUTROPHILS NFR BLD AUTO: 63.2 % — SIGNIFICANT CHANGE UP (ref 43–77)
NRBC # BLD: 0 /100 WBCS — SIGNIFICANT CHANGE UP (ref 0–0)
PHOSPHATE SERPL-MCNC: 3.6 MG/DL — SIGNIFICANT CHANGE UP (ref 2.5–4.5)
PLATELET # BLD AUTO: 160 K/UL — SIGNIFICANT CHANGE UP (ref 150–400)
POTASSIUM SERPL-MCNC: 3.8 MMOL/L — SIGNIFICANT CHANGE UP (ref 3.5–5.3)
POTASSIUM SERPL-SCNC: 3.8 MMOL/L — SIGNIFICANT CHANGE UP (ref 3.5–5.3)
PROT SERPL-MCNC: 7.8 G/DL — SIGNIFICANT CHANGE UP (ref 6–8.3)
RBC # BLD: 4.2 M/UL — SIGNIFICANT CHANGE UP (ref 4.2–5.8)
RBC # FLD: 15.9 % — HIGH (ref 10.3–14.5)
SODIUM SERPL-SCNC: 140 MMOL/L — SIGNIFICANT CHANGE UP (ref 135–145)
WBC # BLD: 8.89 K/UL — SIGNIFICANT CHANGE UP (ref 3.8–10.5)
WBC # FLD AUTO: 8.89 K/UL — SIGNIFICANT CHANGE UP (ref 3.8–10.5)

## 2023-09-12 PROCEDURE — 99284 EMERGENCY DEPT VISIT MOD MDM: CPT

## 2023-09-12 PROCEDURE — 93010 ELECTROCARDIOGRAM REPORT: CPT

## 2023-09-12 PROCEDURE — 36415 COLL VENOUS BLD VENIPUNCTURE: CPT

## 2023-09-12 PROCEDURE — 84100 ASSAY OF PHOSPHORUS: CPT

## 2023-09-12 PROCEDURE — 82962 GLUCOSE BLOOD TEST: CPT

## 2023-09-12 PROCEDURE — 83735 ASSAY OF MAGNESIUM: CPT

## 2023-09-12 PROCEDURE — 80053 COMPREHEN METABOLIC PANEL: CPT

## 2023-09-12 PROCEDURE — 99283 EMERGENCY DEPT VISIT LOW MDM: CPT | Mod: 25

## 2023-09-12 PROCEDURE — 85025 COMPLETE CBC W/AUTO DIFF WBC: CPT

## 2023-09-12 PROCEDURE — 93005 ELECTROCARDIOGRAM TRACING: CPT

## 2023-09-12 NOTE — ED PROVIDER NOTE - PATIENT PORTAL LINK FT
You can access the FollowMyHealth Patient Portal offered by [facility] by registering at the following website: [email or web address]. By joining [employer]’s FollowMyHealth portal, you will also be able to view your health information using other applications (apps) compatible with our system.

## 2023-09-12 NOTE — ED PROVIDER NOTE - PROGRESS NOTE DETAILS
Klepfish: HGb 12.3, Cl 109, bicarb 21, Cr 1.48, other labs grossly wnl. Remains well appearing and at his baseline. Discussed importance of outpt follow up and return precautions. Clinically no indication for further emergent ED workup or hospitalization at this time. Stable for dc, outpt f/u.     Reviewed recent outpt visits w/ spine/pain management, recent MRI.   Meds:   Allopurinol 100 MG Oral Tablet  amLODIPine Besylate 5 MG Oral Tablet  Azopt 1 % Ophthalmic Suspension  Combigan 0.2-0.5 % Ophthalmic Solution  Losartan Potassium 100 MG Oral Tablet  Lumigan 0.01 % Ophthalmic Solution  Metoprolol Succinate ER 25 MG Oral Tablet Extended Release 24 Hour  Metoprolol Tartrate TABS  Nabumetone 750 MG Oral Tablet  PredniSONE 20 MG Oral Tablet  Rhopressa 0.02 % Ophthalmic Solution  Sildenafil Citrate 50 MG Oral Tablet  Simvastatin 20 MG Oral Tablet  tiZANidine HCl - 4 MG Oral Tablet  traMADol HCl TABS

## 2023-09-12 NOTE — ED PROVIDER NOTE - NSFOLLOWUPINSTRUCTIONS_ED_ALL_ED_FT
It is unclear what exactly is causing your symptoms! It is important to continue following up with your doctor outside the hospital and to return to ER for: Persistent fever/vomiting, uncontrolled pain, worsening swelling, worsening breathing, worsening lightheaded, spreading redness.     Stay well hydrated.    Return for fevers, persistent vomit, uncontrolled pain, worsening breathing, worsening lightheaded, focal weakness.    Follow up with primary doctor within 1-2 days.     Can take multivitamin (one daily) if you are not eating a balanced diet.     Paresthesia    Paresthesia is an abnormal burning or prickling sensation. It is usually felt in the hands, arms, legs, or feet. However, it may occur in any part of the body. Usually, paresthesia is not painful. It may feel like:  Tingling or numbness.  Pins and needles.  Skin crawling.  Buzzing.  Arms or legs falling asleep.  Itching.  Paresthesia may occur without any clear cause, or it may be caused by:  Breathing too quickly (hyperventilation).  Pressure on a nerve.  An underlying medical condition.  Side effects of a medication.  Nutritional deficiencies.  Exposure to toxic chemicals.  Most people experience temporary (transient) paresthesia at some time in their lives. For some people, it may be long-lasting (chronic) because of an underlying medical condition. If you have paresthesia that lasts a long time, you may need to be evaluated by your health care provider.    Follow these instructions at home:    Alcohol use   Do not drink alcohol if:  Your health care provider tells you not to drink.  You are pregnant, may be pregnant, or are planning to become pregnant.  If you drink alcohol, limit how much you have:  0–1 drink a day for women.  0–2 drinks a day for men.  Be aware of how much alcohol is in your drink. In the [place] one drink equals one typical bottle of beer (12 oz), one-half glass of wine (5 oz), or one shot of hard liquor (1½ oz).  Nutrition     Eat a healthy diet. This includes:  Eating foods that are high in fiber, such as fresh fruits and vegetables, whole grains, and beans.  Limiting foods that are high in fat and processed sugars, such as fried or sweet foods.    General instructions     Take over-the-counter and prescription medicines only as told by your health care provider.  Do not use any products that contain nicotine or tobacco, such as cigarettes and e-cigarettes. These can keep blood from reaching damaged nerves. If you need help quitting, ask your health care provider.  If you have diabetes, work closely with your health care provider to keep your blood sugar under control.  If you have numbness in your feet:  Check every day for signs of injury or infection. Watch for redness, warmth, and swelling.  Wear padded socks and comfortable shoes. These help protect your feet.  Keep all follow-up visits as told by your health care provider. This is important.    Contact a health care provider if you:  Have paresthesia that gets worse or does not go away.  Have a burning or prickling feeling that gets worse when you walk.  Have pain, cramps, or dizziness.  Develop a rash.  Get help right away if you:  Feel weak.  Have trouble walking or moving.  Have problems with speech, understanding, or vision.  Feel confused.  Cannot control your bladder or bowel movements.  Have numbness after an injury.  Develop new weakness in an arm or leg.  Faint. It is unclear what exactly is causing your symptoms! It is important to continue following up with your doctor outside the hospital and to return to ER for: Persistent fever/vomiting, uncontrolled pain, worsening swelling, worsening breathing, worsening lightheaded, spreading redness.     Stay well hydrated.    Follow up with primary doctor within 1-2 days.    Continue to follow up with your spine doctor and pain management doctor.     We found a few bugs amongst your belongings while you were in the ER - its possible that they are bed begs. Please inspect your sheets at home and consider laundering all your clothes.     If you feel that you are more tired than usual then stop taking the tramadol - this pain medicine can cause people to be sleepy.
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
## 2023-09-12 NOTE — ED ADULT TRIAGE NOTE - GLASGOW COMA SCALE: BEST VERBAL RESPONSE, MLM
Has questions about the use of the rapid  Novolog usage /message sent to the clinic  Amari Vieyra RN [phone]  
(V5) oriented

## 2023-09-12 NOTE — ED PROVIDER NOTE - CLINICAL SUMMARY MEDICAL DECISION MAKING FREE TEXT BOX
73M PMH HTN, spinal surgery presents for unclear reasons. Pt states that he took a bus to his eye doctor and got off a few blocks away. However when he got off the bus he felt numbness to b/l LE and difficulty ambulating - has hx of multiple similar prior episodes. States he sat down on some steps to rest and he thinks he fell asleep and then he remembers 2  waking him up. He states that he thought that perhaps he was tired bec of his BP meds - however he denies taking more pills than he is supposed to. No other systemic symptoms. Currently he feels completely back to his baseline.   States he lives alone and feels like he is able to take care of himself.   Mild bradycardia, other vitals wnl, exam as above.   Clinically no specific toxidrome or withdrawal syndrome.   ddx: Unclear what exactly occurred but sounds like pt fell asleep. Clinically very unlikely to have significant OD.   Possible bed bugs --> decon.   Will check labs, brief obs in ED, likely outpt f/u if no significant findings.

## 2023-09-12 NOTE — ED ADULT NURSE REASSESSMENT NOTE - NS ED NURSE REASSESS COMMENT FT1
bugs noted on pt clothes. charge RN aware. Pt brought to decontamination room.
Pt status improved, alert and awake, speaking in full and clear sentences, NAD at this time. AAOx4 and ambulatory with steady gait. Pt decontaminated and provided with clean clothes for home. Pt requested belongings returned in bag to go home.

## 2023-09-12 NOTE — ED ADULT TRIAGE NOTE - CHIEF COMPLAINT QUOTE
PT BIB EMS for lethargy. Pt endorses "I think I overdosed on my b/p meds." Pt sleepy in triage, but answering all questions." Pt endorses taking metoprolol, losartan, celebrex, and tylenol.

## 2023-09-12 NOTE — ED PROVIDER NOTE - PHYSICAL EXAMINATION
trace b/l LE pitting edema  slow but steady unassisted gait  a few scattered live tiny bugs were found amongst his belongings

## 2023-09-12 NOTE — ED PROVIDER NOTE - OBJECTIVE STATEMENT
73M PMH HTN, spinal surgery presents for unclear reasons. Pt states that he took a bus to his eye doctor and got off a few blocks away. However when he got off the bus he felt numbness to b/l LE and difficulty ambulating - has hx of multiple similar prior episodes. States he sat down on some steps to rest and he thinks he fell asleep and then he remembers 2  waking him up. He states that he thought that perhaps he was tired bec of his BP meds - however he denies taking more pills than he is supposed to. No other systemic symptoms. Currently he feels completely back to his baseline.   States he lives alone and feels like he is able to take care of himself.   Denies falls, HA, SOB/CP, URI symptoms, NVD, abd pain, urinary complaints, other weakness/numbness, thoughts of self harm.

## 2023-09-13 ENCOUNTER — APPOINTMENT (OUTPATIENT)
Dept: ORTHOPEDIC SURGERY | Facility: CLINIC | Age: 73
End: 2023-09-13
Payer: MEDICARE

## 2023-09-13 PROCEDURE — 99213 OFFICE O/P EST LOW 20 MIN: CPT

## 2023-10-03 ENCOUNTER — APPOINTMENT (OUTPATIENT)
Dept: PAIN MANAGEMENT | Facility: CLINIC | Age: 73
End: 2023-10-03
Payer: MEDICARE

## 2023-10-03 VITALS — DIASTOLIC BLOOD PRESSURE: 106 MMHG | SYSTOLIC BLOOD PRESSURE: 202 MMHG | TEMPERATURE: 97.5 F | HEART RATE: 73 BPM

## 2023-10-03 DIAGNOSIS — M54.16 RADICULOPATHY, LUMBAR REGION: ICD-10-CM

## 2023-10-03 DIAGNOSIS — M54.50 LOW BACK PAIN, UNSPECIFIED: ICD-10-CM

## 2023-10-03 DIAGNOSIS — M51.36 OTHER INTERVERTEBRAL DISC DEGENERATION, LUMBAR REGION: ICD-10-CM

## 2023-10-03 PROCEDURE — 99213 OFFICE O/P EST LOW 20 MIN: CPT

## 2023-10-03 RX ORDER — ASPIRIN 500 MG
500 TABLET ORAL
Refills: 0 | Status: ACTIVE | COMMUNITY

## 2023-10-14 PROBLEM — M48.061 LUMBAR CANAL STENOSIS: Status: ACTIVE | Noted: 2019-01-17

## 2023-10-14 PROBLEM — M51.36 DEGENERATION OF INTERVERTEBRAL DISC OF LUMBAR REGION WITH OSTEOPHYTE OF LUMBAR VERTEBRA: Status: ACTIVE | Noted: 2023-10-14
